# Patient Record
Sex: MALE | Race: WHITE
[De-identification: names, ages, dates, MRNs, and addresses within clinical notes are randomized per-mention and may not be internally consistent; named-entity substitution may affect disease eponyms.]

---

## 2018-01-24 ENCOUNTER — HOSPITAL ENCOUNTER (EMERGENCY)
Age: 58
Discharge: HOME | End: 2018-01-24

## 2018-01-24 ENCOUNTER — HOSPITAL ENCOUNTER (EMERGENCY)
Dept: HOSPITAL 91 - E/R | Age: 58
Discharge: HOME | End: 2018-01-24
Payer: COMMERCIAL

## 2018-01-24 DIAGNOSIS — L30.9: Primary | ICD-10-CM

## 2018-01-24 PROCEDURE — 99283 EMERGENCY DEPT VISIT LOW MDM: CPT

## 2023-07-26 ENCOUNTER — HOSPITAL ENCOUNTER (INPATIENT)
Dept: HOSPITAL 54 - ER | Age: 63
LOS: 1 days | Discharge: HOME | DRG: 74 | End: 2023-07-27
Attending: INTERNAL MEDICINE | Admitting: INTERNAL MEDICINE
Payer: COMMERCIAL

## 2023-07-26 VITALS — SYSTOLIC BLOOD PRESSURE: 126 MMHG | DIASTOLIC BLOOD PRESSURE: 70 MMHG | TEMPERATURE: 97.7 F

## 2023-07-26 VITALS — TEMPERATURE: 98 F | SYSTOLIC BLOOD PRESSURE: 142 MMHG | OXYGEN SATURATION: 97 % | DIASTOLIC BLOOD PRESSURE: 77 MMHG

## 2023-07-26 VITALS — BODY MASS INDEX: 23.7 KG/M2 | WEIGHT: 160 LBS | HEIGHT: 69 IN

## 2023-07-26 VITALS — OXYGEN SATURATION: 98 %

## 2023-07-26 DIAGNOSIS — R20.0: ICD-10-CM

## 2023-07-26 DIAGNOSIS — E86.0: ICD-10-CM

## 2023-07-26 DIAGNOSIS — M65.841: ICD-10-CM

## 2023-07-26 DIAGNOSIS — M54.12: Primary | ICD-10-CM

## 2023-07-26 DIAGNOSIS — R53.1: ICD-10-CM

## 2023-07-26 LAB
BASOPHILS # BLD AUTO: 0.1 K/UL (ref 0–0.2)
BASOPHILS NFR BLD AUTO: 1.3 % (ref 0–2)
BUN SERPL-MCNC: 29 MG/DL (ref 7–18)
CALCIUM SERPL-MCNC: 9.6 MG/DL (ref 8.5–10.1)
CHLORIDE SERPL-SCNC: 104 MMOL/L (ref 98–107)
CO2 SERPL-SCNC: 27 MMOL/L (ref 21–32)
CREAT SERPL-MCNC: 1.1 MG/DL (ref 0.6–1.3)
EOSINOPHIL NFR BLD AUTO: 0.6 % (ref 0–6)
GLUCOSE SERPL-MCNC: 112 MG/DL (ref 74–106)
HCT VFR BLD AUTO: 46 % (ref 39–51)
HGB BLD-MCNC: 15.5 G/DL (ref 13.5–17.5)
LYMPHOCYTES NFR BLD AUTO: 2.1 K/UL (ref 0.8–4.8)
LYMPHOCYTES NFR BLD AUTO: 27.4 % (ref 20–44)
MCHC RBC AUTO-ENTMCNC: 34 G/DL (ref 31–36)
MCV RBC AUTO: 98 FL (ref 80–96)
MONOCYTES NFR BLD AUTO: 0.6 K/UL (ref 0.1–1.3)
MONOCYTES NFR BLD AUTO: 8.3 % (ref 2–12)
NEUTROPHILS # BLD AUTO: 4.8 K/UL (ref 1.8–8.9)
NEUTROPHILS NFR BLD AUTO: 62.4 % (ref 43–81)
PLATELET # BLD AUTO: 224 K/UL (ref 150–450)
POTASSIUM SERPL-SCNC: 4.3 MMOL/L (ref 3.5–5.1)
RBC # BLD AUTO: 4.67 MIL/UL (ref 4.5–6)
SODIUM SERPL-SCNC: 140 MMOL/L (ref 136–145)
WBC NRBC COR # BLD AUTO: 7.7 K/UL (ref 4.3–11)

## 2023-07-26 PROCEDURE — G0378 HOSPITAL OBSERVATION PER HR: HCPCS

## 2023-07-26 NOTE — NUR
RN ADMISSION NOTES



RECEIVED PATIENT FROM ER VIA [facility]. PATIENT IS A/O X4, ABLE TO MAKE NEEDS KNOWN. ORIENTED 
TO ROOM AND HOW TO USE THE CALL LIGHT. ON ROOM AIR, BREATHING EVEN AND UNLABORED. NO SOB OR 
S/S OF DISTRESS NOTED. ALL BELONGINGS ACCOUNTED FOR, BELONGING SHEET SIGNED. IV ACCESS LAC 
#20 SL, INTACT, PATENT AND FLUSHING WELL. VITAL SIGNS TAKEN AS FOLLOWS: TEMPERATURE 97.3, BP 
126/79, HR OF 64 AND O2 SATURATION OF 97%. ATTACHED TO EXTERNAL CARDIAC MONITOR WITH READING 
OF SINUS RHYTHM. SKIN ASSESSMENT DONE, SKIN INTACT.  FALL AND SAFETY MEASURES IN PLACE AND 
MAINTAINED AT ALL TIMES, BED ALARM ON, BED IN LOW AND LOCK POSITION, CALL LIGHT AND TABLE 
WITHIN EASY REACH, SIDE RAILS UP X2. WILL CONTINUE TO MONITOR THE PATIENT.

## 2023-07-26 NOTE — NUR
CLOSING NOTES



PATIENT AWAKE IN BED, A/O X4. NO S/S OF PAIN NOTED AT THIS TIME. ON ROOM AIR BREATHING EVEN 
AND UNLABORED, NO DISTRESS OR SOB NOTED. IV ACCESS LAC ML #20G SL INTACT, PATENT AND 
FLUSHING WELL. PATIENT HAS EXTERNAL CARDIAC MONITOR WITH READING OF SINUS RHYTHM WITH HR OF 
68. NO CARDIAC DISTRESS NOTED. PATIENT IS AMBULATORY. FALL AND SAFETY MEASURES IN PLACE AND 
MAINTAINED AT ALL TIMES, BED ALARM ON, BED IN LOW AND LOCK POSITION, CALL LIGHT AND TABLE 
WITHIN EASY REACH, SIDE RAILS UP X2. WILL ENDORSE TO NIGHT SHIFT NURSE

## 2023-07-26 NOTE — NUR
PATIENT TRANSFERED -2, ALL CARE ENDORSED TO SHAHLA DIGGS

-------------------------------------------------------------------------------

Addendum: 07/26/23 at 1619 emmy BATISTA

-------------------------------------------------------------------------------

TRANSFERED -1

## 2023-07-26 NOTE — NUR
BIBS FOR SLURRED SPEECH. PATIENT STATED HE BEGAN FEELING WEAKNESS AND HAVING 
DIFFICULTY HOLDING OBJECTS 3 WEEKS AGO. UPON ASSESSMENT, NO WEAKNESS NOTED X 4 
EXTREMITIES BUT PATIENT SLUGGISH. A/O X 3, ABLE TO MAKE NEEDS KNOWN, TOLERATING 
WELL ON ROOM AIR. WILL CONTINUE TO MONITOR.

## 2023-07-26 NOTE — NUR
TELE RN OPENING NOTE



RECEIVED PATIENT IN BED, WITH HOB ELEVATED, ALERT AND ORIENTED X4, WITH FRIEND AT BEDSIDE. 
AFEBRILE AND NOT IN ANY FORM OF ACUTE DISTRESS. BREATHING EVEN AND NON LABORED. NO C/O PAIN 
OR DISCOMFORT. FACE IS SYMMETRICAL. NO NEW WEAKNESS REPORTED AT THIS TIME. ON TELE 
MONITORING WITH CURRENT READING OF SR 68. WITH IV ACCESS ON LAC 20G-SL. SAFETY MEASURES IN 
PLACE. KEPT BED IN LOCKED AND IN LOW POSITION. SIDE RAILS UP X2. ADVISED TO USE THE CALL 
LIGHT WHEN IN NEED OF ASSISTANCE.

## 2023-07-27 VITALS — DIASTOLIC BLOOD PRESSURE: 69 MMHG | SYSTOLIC BLOOD PRESSURE: 163 MMHG | TEMPERATURE: 97.8 F | OXYGEN SATURATION: 98 %

## 2023-07-27 VITALS — OXYGEN SATURATION: 98 % | DIASTOLIC BLOOD PRESSURE: 80 MMHG | TEMPERATURE: 98 F | SYSTOLIC BLOOD PRESSURE: 132 MMHG

## 2023-07-27 VITALS — SYSTOLIC BLOOD PRESSURE: 134 MMHG | TEMPERATURE: 98.2 F | OXYGEN SATURATION: 97 % | DIASTOLIC BLOOD PRESSURE: 78 MMHG

## 2023-07-27 LAB
BASOPHILS # BLD AUTO: 0.1 K/UL (ref 0–0.2)
BASOPHILS NFR BLD AUTO: 1.2 % (ref 0–2)
BUN SERPL-MCNC: 22 MG/DL (ref 7–18)
CALCIUM SERPL-MCNC: 9 MG/DL (ref 8.5–10.1)
CHLORIDE SERPL-SCNC: 102 MMOL/L (ref 98–107)
CHOLEST SERPL-MCNC: 138 MG/DL (ref ?–200)
CO2 SERPL-SCNC: 25 MMOL/L (ref 21–32)
CREAT SERPL-MCNC: 0.9 MG/DL (ref 0.6–1.3)
EOSINOPHIL NFR BLD AUTO: 1.7 % (ref 0–6)
GLUCOSE SERPL-MCNC: 99 MG/DL (ref 74–106)
HCT VFR BLD AUTO: 45 % (ref 39–51)
HDLC SERPL-MCNC: 33 MG/DL (ref 40–60)
HGB BLD-MCNC: 15.4 G/DL (ref 13.5–17.5)
LDLC SERPL DIRECT ASSAY-MCNC: 87 MG/DL (ref 0–99)
LYMPHOCYTES NFR BLD AUTO: 2.8 K/UL (ref 0.8–4.8)
LYMPHOCYTES NFR BLD AUTO: 39.7 % (ref 20–44)
MCHC RBC AUTO-ENTMCNC: 34 G/DL (ref 31–36)
MCV RBC AUTO: 98 FL (ref 80–96)
MONOCYTES NFR BLD AUTO: 0.7 K/UL (ref 0.1–1.3)
MONOCYTES NFR BLD AUTO: 9.4 % (ref 2–12)
NEUTROPHILS # BLD AUTO: 3.4 K/UL (ref 1.8–8.9)
NEUTROPHILS NFR BLD AUTO: 48 % (ref 43–81)
PLATELET # BLD AUTO: 216 K/UL (ref 150–450)
POTASSIUM SERPL-SCNC: 4 MMOL/L (ref 3.5–5.1)
RBC # BLD AUTO: 4.62 MIL/UL (ref 4.5–6)
SODIUM SERPL-SCNC: 135 MMOL/L (ref 136–145)
TRIGL SERPL-MCNC: 84 MG/DL (ref 30–150)
TSH SERPL DL<=0.005 MIU/L-ACNC: 2.68 UIU/ML (ref 0.36–3.74)
WBC NRBC COR # BLD AUTO: 7 K/UL (ref 4.3–11)

## 2023-07-27 NOTE — NUR
ms rn

received on bed, awake,alert,oriented x4,not in any form of distress, on room air, 
respirations even and unlabored,no sob noted, lungs are clear,abdomen soft, positive bowel 
sounds, denies pain at this time, came in w/ cva signs, , will monitor patient.

## 2023-07-27 NOTE — NUR
ms rn

was seen by MD's, breakfast served,due meds given, tolerated well, patient will be discharge 
today, instructions given.

## 2023-07-27 NOTE — NUR
ms rn

was  by partner, prescription sent to pharmacy of choice.all needs attended, to have 
a follow up w/ primary in one week.

## 2023-07-27 NOTE — NUR
TELE RN CLOSING NOTE



PATIENT IN BED, WITH HOB ELEVATED, ASLEEP BUT EASY TO AROUSE AND RESPONSIVE. ALERT AND 
ORIENTED X4. ABLE TO MAKE NEEDS KNOWN. AFEBRILE AND NOT IN ANY FORM OF ACUTE DISTRESS. 
BREATHING EVEN AND NON LABORED. NO CHANGE IN BASELINE MENTATION. NO C/O PAIN OR DISCOMFORT 
THROUGHOUT THE SHIFT. FACE IS SYMMETRICAL AND NO DROOP NOTED AT THIS TIME. NO NEW WEAKNESS 
REPORTED. ON TELE MONITORING WITH CURRENT READING OF SR 71. WITH IV ACCESS ON LAC 20G-SL. 
MEDICATED AS ORDERED. SAFETY MEASURES IN PLACE. KEPT BED IN LOCKED AND IN LOW POSITION. SIDE 
RAILS UP X2. ADVISED TO USE THE CALL LIGHT WHEN IN NEED OF ASSISTANCE. ALL NURSING NEEDS 
ATTENDED. ENDORSED TO INCOMING SHIFT FOR CONTINUITY OF CARE.